# Patient Record
Sex: MALE | Race: WHITE | NOT HISPANIC OR LATINO | URBAN - METROPOLITAN AREA
[De-identification: names, ages, dates, MRNs, and addresses within clinical notes are randomized per-mention and may not be internally consistent; named-entity substitution may affect disease eponyms.]

---

## 2023-03-27 PROBLEM — H66.91 OTITIS MEDIA OF RIGHT EAR: Status: ACTIVE | Noted: 2023-03-27

## 2023-03-27 PROBLEM — Z04.9 CONDITION NOT FOUND: Status: ACTIVE | Noted: 2023-03-27

## 2023-03-27 PROBLEM — E66.9 OBESITY: Status: ACTIVE | Noted: 2023-03-27

## 2023-03-27 PROBLEM — Z96.22 S/P BILATERAL MYRINGOTOMY WITH TUBE PLACEMENT: Status: ACTIVE | Noted: 2023-03-27

## 2023-03-27 RX ORDER — FLUTICASONE PROPIONATE 50 MCG
2 SPRAY, SUSPENSION (ML) NASAL 2 TIMES DAILY
COMMUNITY
Start: 2016-07-25

## 2023-03-27 RX ORDER — CIPROFLOXACIN AND DEXAMETHASONE 3; 1 MG/ML; MG/ML
4 SUSPENSION/ DROPS AURICULAR (OTIC) 2 TIMES DAILY
COMMUNITY
Start: 2018-03-29

## 2023-03-30 ENCOUNTER — OFFICE VISIT (OUTPATIENT)
Dept: PRIMARY CARE | Facility: CLINIC | Age: 23
End: 2023-03-30
Payer: COMMERCIAL

## 2023-03-30 VITALS — WEIGHT: 213 LBS | SYSTOLIC BLOOD PRESSURE: 118 MMHG | DIASTOLIC BLOOD PRESSURE: 75 MMHG

## 2023-03-30 DIAGNOSIS — Z00.00 HEALTH CARE MAINTENANCE: Primary | ICD-10-CM

## 2023-03-30 DIAGNOSIS — R42 LIGHTHEADEDNESS: ICD-10-CM

## 2023-03-30 DIAGNOSIS — J30.1 ALLERGIC RHINITIS DUE TO POLLEN, UNSPECIFIED SEASONALITY: ICD-10-CM

## 2023-03-30 PROCEDURE — 99395 PREV VISIT EST AGE 18-39: CPT | Performed by: INTERNAL MEDICINE

## 2024-01-26 NOTE — PROGRESS NOTES
Subjective   Patient ID: Tevin Arenas is a 22 y.o. male who presents for No chief complaint on file..    HPI CPE see updated front sheet has been in his real since prior visit no chest pain no shortness of breath has been mindful of his weight and exercise feels well has some sinus sometimes some lightheadedness bowels normal, no dysuria, bones, muscle, joint ok.    Past medical history allergic rhinitis    Medications none occasional Allegra-D    Allergies penicillin    Social history no tobacco no alcohol    Family history mother hypothyroidism father sleep apnea    Prevention now exercising more regularly prior laboratory results reviewed    Review of Systems    Objective   There were no vitals taken for this visit.    Physical Exam vital signs noted alert and oriented x3 NCAT PERRLA EOMI some coryza on the right nares clear discharge OP benign TM normal opaque bilateral EAC clear bilateral no AC nodes no JVD chest clear to auscultation and percussion no wheezing no crackles no thyromegaly CV regular rate and rhythm S1-S2 without murmur gallop or rub abdomen soft nontender normal active bowel sounds LS spine normal curvature negative straight leg raise negative logroll negative SI joint tenderness extremities no clubbing cyanosis or edema normal distal pulses DTR 2+     impression General medical examination lightheadedness allergic rhinitis    Assessment/Plan plan did not want her need any blood work good diet regular exercise increase water consumption change out the Allegra-D for Allegra 180 mg p.o. daily if helpful for the sinus and uses Allegra-D as needed substituting for when there is congestion slow positional changes no particular medicine is needed for dizziness weight stability recheck based on above TT 60 cc 31               50